# Patient Record
Sex: FEMALE | Race: WHITE | NOT HISPANIC OR LATINO | Employment: OTHER | URBAN - NONMETROPOLITAN AREA
[De-identification: names, ages, dates, MRNs, and addresses within clinical notes are randomized per-mention and may not be internally consistent; named-entity substitution may affect disease eponyms.]

---

## 2024-09-13 ENCOUNTER — TELEPHONE (OUTPATIENT)
Dept: UROLOGY | Facility: CLINIC | Age: 64
End: 2024-09-13
Payer: COMMERCIAL

## 2024-09-13 NOTE — TELEPHONE ENCOUNTER
Spoke with patient to let her know she would need to bring a disc with her images on it to her appointment with us on 9/23/2024. Patient verbalized understanding.

## 2024-10-04 NOTE — PROGRESS NOTES
Subjective    Ms. Lepe is 63 y.o. female    Chief Complaint: Bothersome lower urinary tract symptoms, urinalysis positive for blood    History of Present Illness    63-year-old female new patient referred for bothersome lower urinary tract symptoms consisting of urinary frequency, urgency and suprapubic pressure over the last 6 months to a year.  Patient reporting was referred due to blood in urine.  Denies gross hematuria.  Based on labs available to review patient showing trace blood and with microscopic review showing 0-2 red blood cells per high-power field with epithelial cells present.  Unknown family history as patient is adopted.  Denies tobacco use history.  History of glaucoma.  Underwent CT abdomen and pelvis with contrast imaging 5/2024 kidney and bladder unremarkable.  Was found to have a 16 mm low-density area in the central portion of the uterus for which patient states she underwent biopsy which was normal.    The following portions of the patient's history were reviewed and updated as appropriate: allergies, current medications, past family history, past medical history, past social history, past surgical history and problem list.    Review of Systems   Constitutional:  Negative for chills and fever.   Gastrointestinal:  Negative for abdominal pain, anal bleeding and blood in stool.   Genitourinary:  Positive for frequency and urgency. Negative for dysuria and hematuria.         Current Outpatient Medications:     rosuvastatin (CRESTOR) 10 MG tablet, Take 1 tablet by mouth Daily., Disp: , Rfl:     Vibegron 75 MG tablet, Take 1 tablet by mouth Daily., Disp: 30 tablet, Rfl: 11    History reviewed. No pertinent past medical history.    History reviewed. No pertinent surgical history.    Social History     Socioeconomic History    Marital status:    Tobacco Use    Smoking status: Never     Passive exposure: Never    Smokeless tobacco: Never   Vaping Use    Vaping status: Never Used  "      History reviewed. No pertinent family history.    Objective    Temp 97.7 °F (36.5 °C)   Ht 170.2 cm (67\")   Wt 65 kg (143 lb 6.4 oz)   BMI 22.46 kg/m²     Physical Exam        Results for orders placed or performed in visit on 10/28/24   POC Urinalysis Dipstick, Multipro    Collection Time: 10/28/24 11:28 AM    Specimen: Urine   Result Value Ref Range    Color Yellow Yellow, Straw, Dark Yellow, Ivanna    Clarity, UA Clear Clear    Glucose,  mg/dL (A) Negative mg/dL    Bilirubin Negative Negative    Ketones, UA Negative Negative    Specific Gravity  1.010 1.005 - 1.030    Blood, UA Negative Negative    pH, Urine 6.5 5.0 - 8.0    Protein, POC Negative Negative mg/dL    Urobilinogen, UA 0.2 E.U./dL Normal, 0.2 E.U./dL    Nitrite, UA Negative Negative    Leukocytes Negative Negative     Estimation of residual urine via abdominal ultrasound  Residual Urine: 94 ml  Indication: frequency  Position: Supine  Examination: Incremental scanning of the suprapubic area using 3 MHz transducer using copious amounts of acoustic gel.   Findings: An anechoic area was demonstrated which represented the bladder, with measurement of residual urine as noted. I inspected this myself. In that the residual urine was stable or insignificant, no treatment will be necessary at this time.    CT Abdomen Pelvis With Contrast (05/15/2024 08:41 EDT)   Independent review of CT scan of the abdomen/pelvis The patient has undergone a CT scan of the abdomen and pelvis With contrast. The images are available for me to review as an independent interpretation for evaluation and management.  Assessment of the renal parenchyma with regards to thickness, scarring, symmetry in appearance and function, presence of masses both pre-and postcontrast, and calcifications are noted.  The collecting system with regard to dilatation, presence of calcifications, and masses were reviewed.  The course and caliber the ureters also noted.  The renal vessels and " retroperitoneum is inspected for pathology.  The solid viscera and bowel pattern are briefly reviewed, but will also be inspected by the radiologist. The renal pelvis is inspected.  This study shows no kidney stones seen, no renal masses.  No hydronephrosis.  No bladder abnormality seen..    Assessment and Plan    Diagnoses and all orders for this visit:    1. Hematuria, unspecified type (Primary)  -     POC Urinalysis Dipstick, Multipro    2. Urine frequency  -     Vibegron 75 MG tablet; Take 1 tablet by mouth Daily.  Dispense: 30 tablet; Refill: 11      Based on urinalysis today no blood seen.  Microscopic evaluation no blood present.  Based on all previous workup provided patient without the confirmed criteria based on AUA guidelines of microscopic hematuria.  Patient has undergone CT abdomen and pelvis with contrast imaging with the bladder and kidneys unremarkable.  At this point recommend follow-up in 6 months with repeat urinalysis.  Cystoscopy not warranted at this time.    In regards to the urinary frequency overactive bladder symptoms we have discussed treatment with medication.  Patient has glaucoma therefore is unable to use anticholinergics.  Will send in prescription of Gemtesa

## 2024-10-28 ENCOUNTER — PATIENT ROUNDING (BHMG ONLY) (OUTPATIENT)
Dept: UROLOGY | Facility: CLINIC | Age: 64
End: 2024-10-28
Payer: COMMERCIAL

## 2024-10-28 ENCOUNTER — OFFICE VISIT (OUTPATIENT)
Dept: UROLOGY | Facility: CLINIC | Age: 64
End: 2024-10-28
Payer: COMMERCIAL

## 2024-10-28 VITALS — TEMPERATURE: 97.7 F | WEIGHT: 143.4 LBS | HEIGHT: 67 IN | BODY MASS INDEX: 22.51 KG/M2

## 2024-10-28 DIAGNOSIS — R35.0 URINE FREQUENCY: ICD-10-CM

## 2024-10-28 DIAGNOSIS — R31.9 HEMATURIA, UNSPECIFIED TYPE: Primary | ICD-10-CM

## 2024-10-28 LAB
BILIRUB BLD-MCNC: NEGATIVE MG/DL
CLARITY, POC: CLEAR
COLOR UR: YELLOW
GLUCOSE UR STRIP-MCNC: ABNORMAL MG/DL
KETONES UR QL: NEGATIVE
LEUKOCYTE EST, POC: NEGATIVE
NITRITE UR-MCNC: NEGATIVE MG/ML
PH UR: 6.5 [PH] (ref 5–8)
PROT UR STRIP-MCNC: NEGATIVE MG/DL
RBC # UR STRIP: NEGATIVE /UL
SP GR UR: 1.01 (ref 1–1.03)
UROBILINOGEN UR QL: ABNORMAL

## 2024-10-28 PROCEDURE — 99204 OFFICE O/P NEW MOD 45 MIN: CPT

## 2024-10-28 PROCEDURE — 51798 US URINE CAPACITY MEASURE: CPT

## 2024-10-28 PROCEDURE — 81001 URINALYSIS AUTO W/SCOPE: CPT

## 2024-10-28 RX ORDER — ROSUVASTATIN CALCIUM 10 MG/1
10 TABLET, COATED ORAL DAILY
COMMUNITY

## 2024-10-28 NOTE — PROGRESS NOTES
October 28, 2024    Hello, may I speak with Bree Lepe?    My name is Rekha      I am  with [facility]  [address], [address]  [place] [address] [phone].    Before we get started may I verify your date of birth? 1960    I am calling to officially welcome you to our practice and ask about your recent visit. Is this a good time to talk? yes    Tell me about your visit with us. What things went well?  Yes the visit went great. She answered my questions.       We're always looking for ways to make our patients' experiences even better. Do you have recommendations on ways we may improve?  no    Overall were you satisfied with your first visit to our practice? yes       I appreciate you taking the time to speak with me today. Is there anything else I can do for you? no      Thank you, and have a great day.

## 2024-10-30 ENCOUNTER — TELEPHONE (OUTPATIENT)
Dept: UROLOGY | Facility: CLINIC | Age: 64
End: 2024-10-30
Payer: COMMERCIAL

## 2024-10-30 DIAGNOSIS — R35.0 URINE FREQUENCY: Primary | ICD-10-CM

## 2024-10-30 RX ORDER — MIRABEGRON 50 MG/1
50 TABLET, FILM COATED, EXTENDED RELEASE ORAL DAILY
Qty: 90 TABLET | Refills: 3 | Status: SHIPPED | OUTPATIENT
Start: 2024-10-30

## 2024-10-30 NOTE — TELEPHONE ENCOUNTER
Spoke with patient to let her know what the PA through her insurance had said.. I let her know we sent in the Myrbetriq that her insurance was wanting her to try. Patient will call back if the Myrbetriq is too expensive.

## 2024-10-30 NOTE — TELEPHONE ENCOUNTER
Prior authorization is wanting patient to try Myrbetriq. This is the Patient with glaucoma therefore is unable to use anticholinergics.